# Patient Record
Sex: MALE | Race: WHITE | NOT HISPANIC OR LATINO | Employment: FULL TIME | URBAN - NONMETROPOLITAN AREA
[De-identification: names, ages, dates, MRNs, and addresses within clinical notes are randomized per-mention and may not be internally consistent; named-entity substitution may affect disease eponyms.]

---

## 2023-07-17 ENCOUNTER — OFFICE VISIT (OUTPATIENT)
Dept: PRIMARY CARE | Facility: CLINIC | Age: 38
End: 2023-07-17
Payer: COMMERCIAL

## 2023-07-17 VITALS
WEIGHT: 275.1 LBS | TEMPERATURE: 97 F | SYSTOLIC BLOOD PRESSURE: 130 MMHG | DIASTOLIC BLOOD PRESSURE: 100 MMHG | BODY MASS INDEX: 39.47 KG/M2 | HEART RATE: 94 BPM | OXYGEN SATURATION: 98 %

## 2023-07-17 DIAGNOSIS — G93.31 POST VIRAL SYNDROME: Primary | ICD-10-CM

## 2023-07-17 PROBLEM — D49.2 ABNORMAL SKIN GROWTH: Status: ACTIVE | Noted: 2023-07-17

## 2023-07-17 PROBLEM — E55.9 VITAMIN D DEFICIENCY: Status: ACTIVE | Noted: 2023-07-17

## 2023-07-17 PROBLEM — L91.8 ACQUIRED SKIN TAG: Status: ACTIVE | Noted: 2023-07-17

## 2023-07-17 PROBLEM — F40.243 ANXIETY WITH FLYING: Status: ACTIVE | Noted: 2023-07-17

## 2023-07-17 PROCEDURE — 99213 OFFICE O/P EST LOW 20 MIN: CPT | Performed by: FAMILY MEDICINE

## 2023-07-17 PROCEDURE — 1036F TOBACCO NON-USER: CPT | Performed by: FAMILY MEDICINE

## 2023-07-17 RX ORDER — LORATADINE 10 MG/1
10 TABLET ORAL DAILY
COMMUNITY

## 2023-07-17 NOTE — PROGRESS NOTES
" Subjective   Patient ID: Felix Alonzo is a 37 y.o. male who presents for Cough (Had a cough that started last Tuesday and had nasal congestion. Cough has subsided and other symptoms. Pt has just returned from [facility]. Would like to be confirmed that everything is okay. ).  HPI      Mid chest tightness ; cough is gone,but tightness there .  No fever,chills ,  not pain to touch  .   Wanted to be checked. Feels well except for tightness, like somethingis not cleared.       Non smoker, hxof recurrent uri \"problems \" andtakes clairitin.   No hxof asthma.    No sob,no cid    Review of Systems    Objective   BP (!) 130/100   Pulse 94   Temp 36.1 °C (97 °F) (Temporal)   Wt 125 kg (275 lb 1.6 oz)   SpO2 98%   BMI 39.47 kg/m²     Physical Exam  Vitals reviewed.   Constitutional:       Appearance: Normal appearance.   HENT:      Head: Normocephalic and atraumatic.      Right Ear: Tympanic membrane normal.      Left Ear: Tympanic membrane normal.      Mouth/Throat:      Pharynx: Oropharynx is clear. No posterior oropharyngeal erythema.   Eyes:      Conjunctiva/sclera: Conjunctivae normal.      Pupils: Pupils are equal, round, and reactive to light.   Cardiovascular:      Rate and Rhythm: Normal rate and regular rhythm.   Pulmonary:      Effort: Pulmonary effort is normal.      Breath sounds: Normal breath sounds.   Musculoskeletal:      Cervical back: Normal range of motion. No rigidity.   Lymphadenopathy:      Cervical: No cervical adenopathy.   Neurological:      Mental Status: He is alert.         Assessment/Plan   Problem List Items Addressed This Visit    None  Visit Diagnoses       Post viral syndrome    -  Primary          Sxic measures advised. Reassurance. Exam totallynormal. Chest wallnon tender.Nasal spray (flonase for 10 days, then saline prnmayhelp whenhe has uris)   If not improving, return     DALE Renee MD  "

## 2023-08-30 ENCOUNTER — LAB (OUTPATIENT)
Dept: LAB | Facility: LAB | Age: 38
End: 2023-08-30
Payer: COMMERCIAL

## 2023-08-30 ENCOUNTER — OFFICE VISIT (OUTPATIENT)
Dept: PRIMARY CARE | Facility: CLINIC | Age: 38
End: 2023-08-30
Payer: COMMERCIAL

## 2023-08-30 VITALS
DIASTOLIC BLOOD PRESSURE: 87 MMHG | WEIGHT: 274.21 LBS | OXYGEN SATURATION: 96 % | BODY MASS INDEX: 39.26 KG/M2 | HEIGHT: 70 IN | RESPIRATION RATE: 14 BRPM | SYSTOLIC BLOOD PRESSURE: 119 MMHG | HEART RATE: 85 BPM | TEMPERATURE: 98.2 F

## 2023-08-30 DIAGNOSIS — Z71.84 TRAVEL ADVICE ENCOUNTER: ICD-10-CM

## 2023-08-30 DIAGNOSIS — E66.9 OBESITY (BMI 35.0-39.9 WITHOUT COMORBIDITY): ICD-10-CM

## 2023-08-30 DIAGNOSIS — E55.9 VITAMIN D DEFICIENCY: ICD-10-CM

## 2023-08-30 DIAGNOSIS — L91.8 ACQUIRED SKIN TAG: ICD-10-CM

## 2023-08-30 DIAGNOSIS — Z00.00 WELLNESS EXAMINATION: Primary | ICD-10-CM

## 2023-08-30 DIAGNOSIS — Z00.00 WELLNESS EXAMINATION: ICD-10-CM

## 2023-08-30 PROBLEM — D49.2 ABNORMAL SKIN GROWTH: Status: RESOLVED | Noted: 2023-07-17 | Resolved: 2023-08-30

## 2023-08-30 PROCEDURE — 99396 PREV VISIT EST AGE 40-64: CPT | Performed by: FAMILY MEDICINE

## 2023-08-30 PROCEDURE — 82306 VITAMIN D 25 HYDROXY: CPT

## 2023-08-30 PROCEDURE — 36415 COLL VENOUS BLD VENIPUNCTURE: CPT

## 2023-08-30 PROCEDURE — 1036F TOBACCO NON-USER: CPT | Performed by: FAMILY MEDICINE

## 2023-08-30 PROCEDURE — 85027 COMPLETE CBC AUTOMATED: CPT

## 2023-08-30 PROCEDURE — 80061 LIPID PANEL: CPT

## 2023-08-30 PROCEDURE — 80053 COMPREHEN METABOLIC PANEL: CPT

## 2023-08-30 RX ORDER — MEFLOQUINE HYDROCHLORIDE 250 MG/1
250 TABLET ORAL
Qty: 8 TABLET | Refills: 0 | Status: SHIPPED | OUTPATIENT
Start: 2023-08-30 | End: 2023-09-15

## 2023-08-30 RX ORDER — TIRZEPATIDE 2.5 MG/.5ML
2.5 INJECTION, SOLUTION SUBCUTANEOUS
Qty: 2 ML | Refills: 0 | Status: SHIPPED | OUTPATIENT
Start: 2023-08-30 | End: 2023-09-15 | Stop reason: DRUGHIGH

## 2023-08-30 RX ORDER — AZITHROMYCIN 250 MG/1
TABLET, FILM COATED ORAL
Qty: 6 TABLET | Refills: 0 | Status: SHIPPED | OUTPATIENT
Start: 2023-08-30 | End: 2023-09-04

## 2023-08-30 NOTE — PROGRESS NOTES
"Subjective   Patient ID: Felix Alonzo is a 38 y.o. male who presents for annual physical/wellness visit.    He signed document informing that if problem issues also address at today's wellness visit that insurance may be appropriately billed so co-pay and deductible out of pocket expenses may occur.    Has skin tag that he want removed again that was removed last July. Patient stated that it keeps coming back      Tdap: 01/30/2016   HIV screen:  Hepatitis C screen:  Hepatitis B vaccine:    HPI     Review of Systems    Objective   /87 (BP Location: Left arm, Patient Position: Sitting, BP Cuff Size: Large adult)   Pulse 85   Temp 36.8 °C (98.2 °F) (Temporal)   Resp 14   Ht 1.778 m (5' 10\")   Wt 124 kg (274 lb 3.4 oz)   SpO2 96%   BMI 39.35 kg/m²     Physical Exam  Constitutional:       Appearance: Normal appearance. He is normal weight.   HENT:      Head: Normocephalic.      Right Ear: Tympanic membrane, ear canal and external ear normal.      Left Ear: Tympanic membrane, ear canal and external ear normal.      Nose: Nose normal.      Mouth/Throat:      Mouth: Mucous membranes are moist.   Eyes:      Conjunctiva/sclera: Conjunctivae normal.      Pupils: Pupils are equal, round, and reactive to light.   Cardiovascular:      Rate and Rhythm: Normal rate and regular rhythm.   Pulmonary:      Effort: Pulmonary effort is normal.      Breath sounds: Normal breath sounds.   Abdominal:      General: Abdomen is flat. Bowel sounds are normal.      Palpations: Abdomen is soft.   Musculoskeletal:         General: Normal range of motion.      Cervical back: Neck supple.   Skin:     General: Skin is warm.      Comments: Skin tag removed forehead  Removed with surgical scissors  No complications   Neurological:      General: No focal deficit present.      Mental Status: He is alert and oriented to person, place, and time.   Psychiatric:         Mood and Affect: Mood normal.         Behavior: Behavior normal.    " "     Thought Content: Thought content normal.         Judgment: Judgment normal.         Assessment/Plan   Problem List Items Addressed This Visit       Acquired skin tag    Vitamin D deficiency    Relevant Orders    Vitamin D 25-Hydroxy,Total (for eval of Vitamin D levels) (Completed)    Wellness examination - Primary    Relevant Orders    CBC (Completed)    Lipid Panel (Completed)    Comprehensive Metabolic Panel (Completed)    Travel advice encounter    Relevant Medications    mefloquine (Lariam) 250 mg tablet     Other Visit Diagnoses       Obesity (BMI 35.0-39.9 without comorbidity)        Relevant Medications    tirzepatide (Mounjaro) 2.5 mg/0.5 mL pen injector          Tips for your general wellness...;  Remember importance of daily aerobic exercise for 30minutes to help with both your physical and mental/emotional health.  ;  Take time twice a day to relax with focused breathing and relaxation.  A good source to learn \"mindfulness\" relaxation is a book \"Mindfulness for Beginners\" by Lorenzo Harrington.  ;    Strive for healthy eating with plenty of water, fruits, vegetables, and choosing as much plant based diet as able  If do consume non plant protein, choose fish high in omega (like salmon or cod), skinless poultry, and rarely anything from a cow  Avoid processed foods.  ;  Shop the perimeter of the grocery store  - not the inner aisles where all the boxed, bagged, and canned process non natural foods are   Avoid sugar - including fruit juices with added sugar and avoid artificial sweeteners (sucralose, aspartame).; if want to use sweetener, use stevia (natural plant based non caloric sweetener)  Recommended guided nutrition plans include Mediterranean Diet - online resources available     Get plenty of sleep nightly - 7 hours minimum;    Exercise 150min per week;    Do not use tobacco    Abstain or limit alcohol to 1-2 drinks per 24 hours    See your dentist at least yearly    Have an eye check at least " every 5 years    Follow up 1 year for annual wellness checkup;

## 2023-08-31 LAB
ALANINE AMINOTRANSFERASE (SGPT) (U/L) IN SER/PLAS: 23 U/L (ref 10–52)
ALBUMIN (G/DL) IN SER/PLAS: 4.5 G/DL (ref 3.4–5)
ALKALINE PHOSPHATASE (U/L) IN SER/PLAS: 69 U/L (ref 33–120)
ANION GAP IN SER/PLAS: 14 MMOL/L (ref 10–20)
ASPARTATE AMINOTRANSFERASE (SGOT) (U/L) IN SER/PLAS: 20 U/L (ref 9–39)
BILIRUBIN TOTAL (MG/DL) IN SER/PLAS: 0.8 MG/DL (ref 0–1.2)
CALCIDIOL (25 OH VITAMIN D3) (NG/ML) IN SER/PLAS: 42 NG/ML
CALCIUM (MG/DL) IN SER/PLAS: 10.2 MG/DL (ref 8.6–10.6)
CARBON DIOXIDE, TOTAL (MMOL/L) IN SER/PLAS: 28 MMOL/L (ref 21–32)
CHLORIDE (MMOL/L) IN SER/PLAS: 101 MMOL/L (ref 98–107)
CHOLESTEROL (MG/DL) IN SER/PLAS: 218 MG/DL (ref 0–199)
CHOLESTEROL IN HDL (MG/DL) IN SER/PLAS: 47.2 MG/DL
CHOLESTEROL/HDL RATIO: 4.6
CREATININE (MG/DL) IN SER/PLAS: 0.95 MG/DL (ref 0.5–1.3)
ERYTHROCYTE DISTRIBUTION WIDTH (RATIO) BY AUTOMATED COUNT: 13.2 % (ref 11.5–14.5)
ERYTHROCYTE MEAN CORPUSCULAR HEMOGLOBIN CONCENTRATION (G/DL) BY AUTOMATED: 34.8 G/DL (ref 32–36)
ERYTHROCYTE MEAN CORPUSCULAR VOLUME (FL) BY AUTOMATED COUNT: 89 FL (ref 80–100)
ERYTHROCYTES (10*6/UL) IN BLOOD BY AUTOMATED COUNT: 5.25 X10E12/L (ref 4.5–5.9)
GFR MALE: >90 ML/MIN/1.73M2
GLUCOSE (MG/DL) IN SER/PLAS: 80 MG/DL (ref 74–99)
HEMATOCRIT (%) IN BLOOD BY AUTOMATED COUNT: 46.6 % (ref 41–52)
HEMOGLOBIN (G/DL) IN BLOOD: 16.2 G/DL (ref 13.5–17.5)
LDL: 137 MG/DL (ref 0–99)
LEUKOCYTES (10*3/UL) IN BLOOD BY AUTOMATED COUNT: 6.2 X10E9/L (ref 4.4–11.3)
NRBC (PER 100 WBCS) BY AUTOMATED COUNT: 0 /100 WBC (ref 0–0)
PLATELETS (10*3/UL) IN BLOOD AUTOMATED COUNT: 181 X10E9/L (ref 150–450)
POTASSIUM (MMOL/L) IN SER/PLAS: 4.2 MMOL/L (ref 3.5–5.3)
PROTEIN TOTAL: 7.7 G/DL (ref 6.4–8.2)
SODIUM (MMOL/L) IN SER/PLAS: 139 MMOL/L (ref 136–145)
TRIGLYCERIDE (MG/DL) IN SER/PLAS: 167 MG/DL (ref 0–149)
UREA NITROGEN (MG/DL) IN SER/PLAS: 15 MG/DL (ref 6–23)
VLDL: 33 MG/DL (ref 0–40)

## 2023-09-15 ENCOUNTER — TELEPHONE (OUTPATIENT)
Dept: PRIMARY CARE | Facility: CLINIC | Age: 38
End: 2023-09-15
Payer: COMMERCIAL

## 2023-09-15 DIAGNOSIS — Z71.84 TRAVEL ADVICE ENCOUNTER: Primary | ICD-10-CM

## 2023-09-15 DIAGNOSIS — E66.9 OBESITY (BMI 35.0-39.9 WITHOUT COMORBIDITY): ICD-10-CM

## 2023-09-15 RX ORDER — TIRZEPATIDE 5 MG/.5ML
5 INJECTION, SOLUTION SUBCUTANEOUS
Qty: 2 ML | Refills: 2 | Status: SHIPPED | OUTPATIENT
Start: 2023-09-15 | End: 2023-10-15 | Stop reason: SDUPTHER

## 2023-09-15 RX ORDER — ATOVAQUONE AND PROGUANIL HYDROCHLORIDE 250; 100 MG/1; MG/1
1 TABLET, FILM COATED ORAL DAILY
Qty: 30 TABLET | Refills: 0 | Status: SHIPPED | OUTPATIENT
Start: 2023-09-15 | End: 2024-05-21 | Stop reason: ALTCHOICE

## 2023-09-15 NOTE — TELEPHONE ENCOUNTER
Patient called with a few updates    Miranda check in: has taken 2 doses, third dose tomorrow.  He has no side effects.  He has lost 2 pounds in the last two weeks.  He would like an increase    Mefloquine was prescribed for travel.  He researched the medication and the mental side effects he is not willing to tolerate.  He would like malarone prescribed instead if possible    People in his travel group were also prescribed steroids just in case as well, is this something you recommend/would prescribe for him?    Sending to covering doctor for travel medications, please forward to Dr. Connors after

## 2023-09-15 NOTE — TELEPHONE ENCOUNTER
Mounjaro dose increase sent    Malarone sent for malaria prevention for travel    No indication to prescribe a steroid - not sure why others obtained this

## 2023-10-13 ENCOUNTER — TELEPHONE (OUTPATIENT)
Dept: PRIMARY CARE | Facility: CLINIC | Age: 38
End: 2023-10-13

## 2023-10-13 NOTE — TELEPHONE ENCOUNTER
Pt was told to call and report how he is doing on the mounjaro and he is doing well no side effects and losing about 1/2 to 2 pounds per week. Pt will need a refill next week when you return.

## 2023-10-15 DIAGNOSIS — E66.9 OBESITY (BMI 35.0-39.9 WITHOUT COMORBIDITY): ICD-10-CM

## 2023-10-15 RX ORDER — TIRZEPATIDE 5 MG/.5ML
5 INJECTION, SOLUTION SUBCUTANEOUS
Qty: 2 ML | Refills: 2 | Status: SHIPPED | OUTPATIENT
Start: 2023-10-15 | End: 2023-11-13 | Stop reason: DRUGHIGH

## 2023-11-10 ENCOUNTER — TELEPHONE (OUTPATIENT)
Dept: PRIMARY CARE | Facility: CLINIC | Age: 38
End: 2023-11-10
Payer: COMMERCIAL

## 2023-11-10 NOTE — TELEPHONE ENCOUNTER
Pt called wanting to know when his Mounjaro can be increased. He noticed the weight loss has started to decrease. He has not picked up his Mounjaro for this month just incase you are going to increase the strength. Pt is aware that [employer] is not in the office till Tuesday.

## 2023-11-11 DIAGNOSIS — E66.9 OBESITY (BMI 35.0-39.9 WITHOUT COMORBIDITY): ICD-10-CM

## 2023-11-13 RX ORDER — TIRZEPATIDE 7.5 MG/.5ML
7.5 INJECTION, SOLUTION SUBCUTANEOUS
Qty: 0.5 ML | Refills: 2 | Status: SHIPPED | OUTPATIENT
Start: 2023-11-13 | End: 2024-02-21 | Stop reason: SDUPTHER

## 2023-11-13 NOTE — TELEPHONE ENCOUNTER
06/24/19 1526   Post-Acute Status   Post-Acute Authorization Placement   SW left a Physical Rehab list in pt's room. SW called Areli(sister) and notified her. She is not ready to pick now but will look at list and get back to SW 6/25.    Reyna Bell, RODNEY  [id number]   Increase to 7.5mg weekly  I sent new dose

## 2024-02-21 ENCOUNTER — TELEPHONE (OUTPATIENT)
Dept: PRIMARY CARE | Facility: CLINIC | Age: 39
End: 2024-02-21
Payer: COMMERCIAL

## 2024-02-21 DIAGNOSIS — E66.9 OBESITY (BMI 35.0-39.9 WITHOUT COMORBIDITY): ICD-10-CM

## 2024-02-21 RX ORDER — TIRZEPATIDE 7.5 MG/.5ML
7.5 INJECTION, SOLUTION SUBCUTANEOUS
Qty: 0.5 ML | Refills: 2 | Status: SHIPPED | OUTPATIENT
Start: 2024-02-21 | End: 2024-05-21 | Stop reason: ALTCHOICE

## 2024-03-13 ENCOUNTER — TELEPHONE (OUTPATIENT)
Dept: PRIMARY CARE | Facility: CLINIC | Age: 39
End: 2024-03-13
Payer: COMMERCIAL

## 2024-03-13 DIAGNOSIS — E66.9 OBESITY (BMI 35.0-39.9 WITHOUT COMORBIDITY): Primary | ICD-10-CM

## 2024-03-13 RX ORDER — TIRZEPATIDE 5 MG/.5ML
5 INJECTION, SOLUTION SUBCUTANEOUS
Qty: 6 ML | Refills: 1 | Status: SHIPPED | OUTPATIENT
Start: 2024-03-13 | End: 2024-05-21 | Stop reason: ALTCHOICE

## 2024-03-13 RX ORDER — TIRZEPATIDE 2.5 MG/.5ML
2.5 INJECTION, SOLUTION SUBCUTANEOUS
Qty: 6 ML | Refills: 1 | Status: SHIPPED | OUTPATIENT
Start: 2024-03-13 | End: 2024-05-21 | Stop reason: ALTCHOICE

## 2024-03-13 NOTE — TELEPHONE ENCOUNTER
Patient called back and said that the pharmacy notified him that you should send in a 3 month supply so that he has it.

## 2024-03-13 NOTE — TELEPHONE ENCOUNTER
Pt called because his pharmacy does not have his Mounjaro 7.5mg in stock. They do have Mounjaro 5mg and Mounjaro 2.5mg in stock. Pt could give him self 2 shots and would have to pay 2 co pays. Pt is fine with all this if you are. Pt uses [facility].

## 2024-03-27 PROBLEM — R93.1 ELEVATED CORONARY ARTERY CALCIUM SCORE: Status: ACTIVE | Noted: 2024-03-27

## 2024-05-21 ENCOUNTER — OFFICE VISIT (OUTPATIENT)
Dept: PRIMARY CARE | Facility: CLINIC | Age: 39
End: 2024-05-21
Payer: COMMERCIAL

## 2024-05-21 VITALS
BODY MASS INDEX: 27.31 KG/M2 | OXYGEN SATURATION: 98 % | DIASTOLIC BLOOD PRESSURE: 92 MMHG | TEMPERATURE: 97.6 F | SYSTOLIC BLOOD PRESSURE: 148 MMHG | WEIGHT: 190.3 LBS | HEART RATE: 85 BPM

## 2024-05-21 DIAGNOSIS — R93.1 ELEVATED CORONARY ARTERY CALCIUM SCORE: ICD-10-CM

## 2024-05-21 DIAGNOSIS — E55.9 VITAMIN D DEFICIENCY: ICD-10-CM

## 2024-05-21 DIAGNOSIS — E78.2 MIXED HYPERLIPIDEMIA: ICD-10-CM

## 2024-05-21 DIAGNOSIS — E66.3 OVERWEIGHT (BMI 25.0-29.9): ICD-10-CM

## 2024-05-21 DIAGNOSIS — F40.243 ANXIETY WITH FLYING: Primary | ICD-10-CM

## 2024-05-21 PROBLEM — R20.2 FACIAL PARESTHESIA: Status: ACTIVE | Noted: 2024-05-21

## 2024-05-21 PROBLEM — Z71.84 TRAVEL ADVICE ENCOUNTER: Status: RESOLVED | Noted: 2023-08-30 | Resolved: 2024-05-21

## 2024-05-21 PROCEDURE — 99214 OFFICE O/P EST MOD 30 MIN: CPT | Performed by: FAMILY MEDICINE

## 2024-05-21 RX ORDER — BUSPIRONE HYDROCHLORIDE 5 MG/1
5 TABLET ORAL 3 TIMES DAILY PRN
Qty: 30 TABLET | Refills: 2 | Status: SHIPPED | OUTPATIENT
Start: 2024-05-21 | End: 2025-05-21

## 2024-05-21 RX ORDER — ROSUVASTATIN CALCIUM 5 MG/1
5 TABLET, COATED ORAL DAILY
COMMUNITY

## 2024-05-21 RX ORDER — TIRZEPATIDE 10 MG/.5ML
10 INJECTION, SOLUTION SUBCUTANEOUS
Qty: 2 ML | Refills: 2 | Status: SHIPPED | OUTPATIENT
Start: 2024-05-26

## 2024-05-21 RX ORDER — LORAZEPAM 1 MG/1
1-2 TABLET ORAL EVERY 6 HOURS PRN
Qty: 10 TABLET | Refills: 0 | Status: SHIPPED | OUTPATIENT
Start: 2024-05-21 | End: 2024-06-20

## 2024-05-21 ASSESSMENT — ENCOUNTER SYMPTOMS
EYE PAIN: 1
EYE REDNESS: 1
NUMBNESS: 0
NERVOUS/ANXIOUS: 1

## 2024-05-21 NOTE — ASSESSMENT & PLAN NOTE
Worsening, flies frequently during summer.  Not anxious during actual flight, anxiety peaks days leading up to departure.   Begin Buspar 5 mg TID PRN and Ativan 1-2 mg PRN as prescribed .

## 2024-05-21 NOTE — ASSESSMENT & PLAN NOTE
Lab Results   Component Value Date    CHOL 218 (H) 08/30/2023    CHOL 169 11/05/2021    CHOL 165 10/16/2020     Lab Results   Component Value Date    HDL 47.2 08/30/2023    HDL 35.5 (A) 11/05/2021    HDL 46.0 10/16/2020     Lab Results   Component Value Date    LDLF 137 (H) 08/30/2023     Lab Results   Component Value Date    TRIG 167 (H) 08/30/2023    TRIG 105 11/05/2021    TRIG 79 10/16/2020   Continue rosuvastatin 5 mg daily.  Order placed for repeat lipid panel - due in July.

## 2024-05-21 NOTE — ASSESSMENT & PLAN NOTE
Weight 7/2023 275  With aid of GLP-1 Mounjaro has significantly reduced weight  Continue current 10mg weekly dose

## 2024-05-21 NOTE — ASSESSMENT & PLAN NOTE
Screening CT for executive health  Calcium Score (Agatston Units):  LM: 0 AU  LAD: 97.51 AU  LCx: 0 AU  RCA: 0 AU  Other: 0 AU   Statin has been started  Repeat fasting lipid profile and CV risk labs in 2 months

## 2024-05-21 NOTE — PROGRESS NOTES
Subjective   Patient ID: Felix Alonzo is a 38 y.o. male who presents for weight loss.    Would like to discuss a tightness/discomfort that is happening on the right side of his face.     He is happy with the weight loss that he has had with Mounjaro. Eventually the plan is to drop back down to 7.5 mg, from 10 mg. He reports one week per month where the injection will make him nauseous but he is fine the other 3 weeks. He has been on the increased dose since March of this year.  About 3 weeks ago the right side of his forehead started having a burning sensation that radiated down to his jaw. It is very sensitive and even blinking his eye can bother it. These symptoms have become gradually better since onset.  His flying anxiety is worsening. He finds himself checking the weather that he will go through and he keeps seeing stories that worsen the problem. He states he has 10 international flights in the next 90 days.   His father was diagnosed with stage 2 lung cancer early in January this year, he was a very heavy cigarette smoker most of his life. He just started his 4th round of chemo today and is eligible for surgical intervention.          Review of Systems   Eyes:  Positive for pain (right, mild) and redness (right).   Skin:  Negative for rash.   Neurological:  Negative for numbness.        Facial tightness/burning - right side   Psychiatric/Behavioral:  The patient is nervous/anxious (with flying).        Objective   BP (!) 148/92   Pulse 85   Temp 36.4 °C (97.6 °F)   Wt 86.3 kg (190 lb 4.8 oz)   SpO2 98%   BMI 27.31 kg/m²     Physical Exam  Constitutional:       Appearance: Normal appearance.   HENT:      Head:      Comments: Tenderness from forehead to jaw line, right side - not behind ear  Eyes:      General:         Right eye: No foreign body or discharge.      Pupils: Pupils are equal, round, and reactive to light.   Neurological:      General: No focal deficit present.      Mental Status: He  is alert.   Psychiatric:         Mood and Affect: Mood normal.         Behavior: Behavior normal.         Thought Content: Thought content normal.         Judgment: Judgment normal.         Assessment/Plan   Problem List Items Addressed This Visit       Anxiety with flying - Primary     Worsening, flies frequently during summer.  Not anxious during actual flight, anxiety peaks days leading up to departure.   Begin Buspar 5 mg TID PRN and Ativan 1-2 mg PRN as prescribed .         Relevant Medications    LORazepam (Ativan) 1 mg tablet    Vitamin D deficiency     8/30/2023 - 42  Continue vitamin D supplement daily.         Elevated coronary artery calcium score     Screening CT for executive health  Calcium Score (Agatston Units):  LM: 0 AU  LAD: 97.51 AU  LCx: 0 AU  RCA: 0 AU  Other: 0 AU   Statin has been started  Repeat fasting lipid profile and CV risk labs in 2 months         Relevant Medications    busPIRone (Buspar) 5 mg tablet    tirzepatide (Mounjaro) 10 mg/0.5 mL pen injector    Other Relevant Orders    Lipoprotein A (LPA)    Homocysteine, serum    Mixed hyperlipidemia     Lab Results   Component Value Date    CHOL 218 (H) 08/30/2023    CHOL 169 11/05/2021    CHOL 165 10/16/2020     Lab Results   Component Value Date    HDL 47.2 08/30/2023    HDL 35.5 (A) 11/05/2021    HDL 46.0 10/16/2020     Lab Results   Component Value Date    LDLF 137 (H) 08/30/2023     Lab Results   Component Value Date    TRIG 167 (H) 08/30/2023    TRIG 105 11/05/2021    TRIG 79 10/16/2020   Continue rosuvastatin 5 mg daily.  Order placed for repeat lipid panel - due in July.         Relevant Medications    busPIRone (Buspar) 5 mg tablet    tirzepatide (Mounjaro) 10 mg/0.5 mL pen injector    Other Relevant Orders    Lipid Panel    Lipoprotein A (LPA)    Homocysteine, serum    Overweight (BMI 25.0-29.9)     Weight 7/2023 275  With aid of GLP-1 Mounjaro has significantly reduced weight  Continue current 10mg weekly dose          Relevant  Medications    tirzepatide (Mounjaro) 10 mg/0.5 mL pen injector       Scribe Attestation  By signing my name below, I, Mahendra Garcia   attest that this documentation has been prepared under the direction and in the presence of Heriberto Connors MD.

## 2024-05-23 ENCOUNTER — APPOINTMENT (OUTPATIENT)
Dept: PRIMARY CARE | Facility: CLINIC | Age: 39
End: 2024-05-23
Payer: COMMERCIAL

## 2024-05-28 NOTE — ASSESSMENT & PLAN NOTE
I believe he had shingles without rash   Symptoms resolving  Call if not continue to resolve completely over next month

## 2024-06-24 DIAGNOSIS — E78.2 MIXED HYPERLIPIDEMIA: Primary | ICD-10-CM

## 2024-06-24 RX ORDER — ROSUVASTATIN CALCIUM 5 MG/1
5 TABLET, COATED ORAL DAILY
Qty: 90 TABLET | Refills: 1 | Status: SHIPPED | OUTPATIENT
Start: 2024-06-24

## 2024-07-12 ENCOUNTER — LAB (OUTPATIENT)
Dept: LAB | Facility: LAB | Age: 39
End: 2024-07-12
Payer: COMMERCIAL

## 2024-07-12 DIAGNOSIS — E78.2 MIXED HYPERLIPIDEMIA: ICD-10-CM

## 2024-07-12 DIAGNOSIS — R93.1 ELEVATED CORONARY ARTERY CALCIUM SCORE: ICD-10-CM

## 2024-07-12 LAB
CHOLEST SERPL-MCNC: 127 MG/DL (ref 0–199)
CHOLESTEROL/HDL RATIO: 2.4
HDLC SERPL-MCNC: 52.3 MG/DL
LDLC SERPL CALC-MCNC: 61 MG/DL
NON HDL CHOLESTEROL: 75 MG/DL (ref 0–149)
TRIGL SERPL-MCNC: 70 MG/DL (ref 0–149)
VLDL: 14 MG/DL (ref 0–40)

## 2024-07-12 PROCEDURE — 83090 ASSAY OF HOMOCYSTEINE: CPT

## 2024-07-12 PROCEDURE — 36415 COLL VENOUS BLD VENIPUNCTURE: CPT

## 2024-07-12 PROCEDURE — 82172 ASSAY OF APOLIPOPROTEIN: CPT

## 2024-07-12 PROCEDURE — 80061 LIPID PANEL: CPT

## 2024-07-13 LAB
HCYS SERPL-SCNC: 11.92 UMOL/L (ref 5–13.9)
LPA SERPL-MCNC: 136 MG/DL

## 2024-08-02 ENCOUNTER — LAB (OUTPATIENT)
Dept: LAB | Facility: LAB | Age: 39
End: 2024-08-02
Payer: COMMERCIAL

## 2024-08-02 ENCOUNTER — OFFICE VISIT (OUTPATIENT)
Dept: PRIMARY CARE | Facility: CLINIC | Age: 39
End: 2024-08-02
Payer: COMMERCIAL

## 2024-08-02 VITALS
HEART RATE: 92 BPM | SYSTOLIC BLOOD PRESSURE: 116 MMHG | WEIGHT: 180.4 LBS | OXYGEN SATURATION: 97 % | RESPIRATION RATE: 14 BRPM | DIASTOLIC BLOOD PRESSURE: 73 MMHG | BODY MASS INDEX: 25.88 KG/M2 | TEMPERATURE: 98.4 F

## 2024-08-02 DIAGNOSIS — R23.8 SKIN SENSITIVITY: ICD-10-CM

## 2024-08-02 DIAGNOSIS — L29.9 ITCHING: Primary | ICD-10-CM

## 2024-08-02 DIAGNOSIS — L29.9 ITCHING: ICD-10-CM

## 2024-08-02 PROCEDURE — 99214 OFFICE O/P EST MOD 30 MIN: CPT | Performed by: FAMILY MEDICINE

## 2024-08-02 PROCEDURE — 84443 ASSAY THYROID STIM HORMONE: CPT

## 2024-08-02 PROCEDURE — 1036F TOBACCO NON-USER: CPT | Performed by: FAMILY MEDICINE

## 2024-08-02 PROCEDURE — 82248 BILIRUBIN DIRECT: CPT

## 2024-08-02 PROCEDURE — 36415 COLL VENOUS BLD VENIPUNCTURE: CPT

## 2024-08-02 PROCEDURE — 80053 COMPREHEN METABOLIC PANEL: CPT

## 2024-08-02 RX ORDER — METHYLPREDNISOLONE 4 MG/1
TABLET ORAL
Qty: 21 TABLET | Refills: 0 | Status: SHIPPED | OUTPATIENT
Start: 2024-08-02

## 2024-08-02 RX ORDER — NEOMYCIN SULFATE, POLYMYXIN B SULFATE AND DEXAMETHASONE 3.5; 10000; 1 MG/ML; [USP'U]/ML; MG/ML
SUSPENSION/ DROPS OPHTHALMIC
COMMUNITY
Start: 2024-07-08 | End: 2024-08-02 | Stop reason: WASHOUT

## 2024-08-02 RX ORDER — HYDROXYZINE HYDROCHLORIDE 10 MG/1
TABLET, FILM COATED ORAL
Qty: 30 TABLET | Refills: 0 | Status: SHIPPED | OUTPATIENT
Start: 2024-08-02

## 2024-08-02 NOTE — PROGRESS NOTES
Subjective        Felix Alonzo is a 38 y.o. male who presents for      HPI:    Dr Connors pt       Chief Complaint   Patient presents with    Itching     originated at top, right side of head approx 1 month ago after returning from a trip to the [place]. Sxs have since traveled to other areas of body including right side of neck, right axilla and left hip.     Has pt been on any Medication/antibiotic  recently? Augmentin (10 day course-ended 1 week ago, sxs began before start of abx)- for sinus     Any new lotions/creams. Detergents? No    Any new pets? No    Recent travel?  [place], [place], [place] , [place], [place]- vacation and work    No obvious exposures    On Mounjaro for 11 months       Symptoms all in the skin- irritation. Itching    Never have broken out - no blisters       Fever- No  Cough- No  Nausea- No  Vomiting- No  Blood in urine- No  Blood in stool-No  Pain- No  Lip swelling-No  Wheezing- No    how often do symptoms occur? CONSTANT      has pt tried anything for current symptoms, including medications (OTC or prescription)  ?  No      what makes symptoms worse? Nothing       has pt been seen recently for this problem ( within past 2-3 weeks) ?    if yes- where? No                Social History     Tobacco Use   Smoking Status Former    Current packs/day: 0.00    Types: Cigarettes    Quit date:     Years since quittin.5   Smokeless Tobacco Never         Review of Systems:        Objective        Vitals:    24 1412   BP: 116/73   BP Location: Left arm   Patient Position: Sitting   BP Cuff Size: Adult   Pulse: 92   Resp: 14   Temp: 36.9 °C (98.4 °F)   SpO2: 97%   Weight: 81.8 kg (180 lb 6.4 oz)             Patient is alert and oriented x 3 , NAD  HEAD- normocephalic and atraumatic   EYES-conjunctiva-normal, lids - normal  EARS/NOSE- normal external exam   NECK-supple,FROM  CV- RRR without murmur  PULM- CTA bilaterally, normal respiratory effort  RESPIRATORY EFFORT- normal  , no retractions or nasal flaring   ABD- normoactive BS's   EXT- no edema,NT  SKIN- no abnormal skin lesions noted  NEURO- no focal deficits  PSYCH- pleasant, normal judgement and insight            BP Readings from Last 3 Encounters:   08/02/24 116/73   05/21/24 (!) 148/92   08/30/23 119/87           Wt Readings from Last 3 Encounters:   08/02/24 81.8 kg (180 lb 6.4 oz)   05/21/24 86.3 kg (190 lb 4.8 oz)   08/30/23 124 kg (274 lb 3.4 oz)         BMI Readings from Last 3 Encounters:   08/02/24 25.88 kg/m²   05/21/24 27.31 kg/m²   08/30/23 39.35 kg/m²           Assessment & Plan  Itching    Orders:    methylPREDNISolone (Medrol Dospak) 4 mg tablets; Follow schedule on package instructions- Dose Jama    Referral to Allergy; Future    hydrOXYzine HCL (Atarax) 10 mg tablet; Take one tablet three times a day as needed fro itching, do not drive while taking medication    TSH; Future    Basic metabolic panel; Future    Hepatic function panel; Future    Skin sensitivity    Orders:    TSH; Future    Basic metabolic panel; Future    Hepatic function panel; Future         Refer allergist     Start medrol dose pack  Start atarax     On claritin         Ordered labs          Call if no better or if symptoms worsen

## 2024-08-03 LAB
ALBUMIN SERPL BCP-MCNC: 4.6 G/DL (ref 3.4–5)
ALP SERPL-CCNC: 52 U/L (ref 33–120)
ALT SERPL W P-5'-P-CCNC: 18 U/L (ref 10–52)
ANION GAP SERPL CALC-SCNC: 13 MMOL/L (ref 10–20)
AST SERPL W P-5'-P-CCNC: 19 U/L (ref 9–39)
BILIRUB DIRECT SERPL-MCNC: 0.2 MG/DL (ref 0–0.3)
BILIRUB SERPL-MCNC: 0.9 MG/DL (ref 0–1.2)
BUN SERPL-MCNC: 14 MG/DL (ref 6–23)
CALCIUM SERPL-MCNC: 10.3 MG/DL (ref 8.6–10.6)
CHLORIDE SERPL-SCNC: 103 MMOL/L (ref 98–107)
CO2 SERPL-SCNC: 29 MMOL/L (ref 21–32)
CREAT SERPL-MCNC: 1.13 MG/DL (ref 0.5–1.3)
EGFRCR SERPLBLD CKD-EPI 2021: 84 ML/MIN/1.73M*2
GLUCOSE SERPL-MCNC: 89 MG/DL (ref 74–99)
POTASSIUM SERPL-SCNC: 4.5 MMOL/L (ref 3.5–5.3)
PROT SERPL-MCNC: 7.1 G/DL (ref 6.4–8.2)
SODIUM SERPL-SCNC: 140 MMOL/L (ref 136–145)
TSH SERPL-ACNC: 1.25 MIU/L (ref 0.44–3.98)

## 2024-08-06 ENCOUNTER — TELEPHONE (OUTPATIENT)
Dept: PRIMARY CARE | Facility: CLINIC | Age: 39
End: 2024-08-06
Payer: COMMERCIAL

## 2024-08-06 NOTE — TELEPHONE ENCOUNTER
I reviewed chart - it shows he has appointment with [facility] allergist 8/26 - please confirm.  Inquire if having ongoing rash, itching, any changes, any relief with the medrol steroid pack or the atarax Dr You prescribed?  Other option for referral is dermatologist if want to see if can get him seen sooner than 8/26

## 2024-08-06 NOTE — TELEPHONE ENCOUNTER
Patient was seen 8/2 for skin issues with Dr. You    Labs came back normal, was referred to allergist and is not able to be seen until November    He finds this unacceptable and would like other options/PCP opinion     Does not feel like his issue was addressed properly.  Did advise he could call other hospital systems and we can fax referral.  He would like to be seen much sooner than November if you agree this is the correct path

## 2024-08-07 DIAGNOSIS — L29.9 ITCHING: Primary | ICD-10-CM

## 2024-08-07 DIAGNOSIS — L30.9 DERMATITIS: ICD-10-CM

## 2024-08-07 NOTE — TELEPHONE ENCOUNTER
Spoke with pt. Pt did not keep apt with Allergist. Pt still has rash/itching. It has not gotten any worse. Pt would like a referral to a dermatologist.

## 2024-08-20 ENCOUNTER — APPOINTMENT (OUTPATIENT)
Dept: PRIMARY CARE | Facility: CLINIC | Age: 39
End: 2024-08-20
Payer: COMMERCIAL

## 2024-12-02 ENCOUNTER — OFFICE VISIT (OUTPATIENT)
Dept: PRIMARY CARE | Facility: CLINIC | Age: 39
End: 2024-12-02
Payer: COMMERCIAL

## 2024-12-02 VITALS
BODY MASS INDEX: 25.66 KG/M2 | TEMPERATURE: 97.3 F | WEIGHT: 178.8 LBS | HEART RATE: 75 BPM | OXYGEN SATURATION: 98 % | SYSTOLIC BLOOD PRESSURE: 126 MMHG | RESPIRATION RATE: 16 BRPM | DIASTOLIC BLOOD PRESSURE: 78 MMHG

## 2024-12-02 DIAGNOSIS — K64.2 GRADE III HEMORRHOIDS: Primary | ICD-10-CM

## 2024-12-02 PROCEDURE — 99213 OFFICE O/P EST LOW 20 MIN: CPT | Performed by: FAMILY MEDICINE

## 2024-12-02 PROCEDURE — 1036F TOBACCO NON-USER: CPT | Performed by: FAMILY MEDICINE

## 2024-12-02 RX ORDER — PRAMOXINE HYDROCHLORIDE HYDROCORTISONE ACETATE 100; 100 MG/10G; MG/10G
1 AEROSOL, FOAM TOPICAL 2 TIMES DAILY
Qty: 10 G | Refills: 0 | Status: SHIPPED | OUTPATIENT
Start: 2024-12-02

## 2024-12-02 RX ORDER — HYDROCORTISONE 25 MG/G
CREAM TOPICAL 4 TIMES DAILY PRN
Qty: 30 G | Refills: 0 | Status: SHIPPED | OUTPATIENT
Start: 2024-12-02 | End: 2025-12-02

## 2024-12-02 NOTE — PATIENT INSTRUCTIONS
If worsening, more painful, darker purple color, larger, please let us know, we will get you set up with a surgeon urgently.    If not improving, please let us know and we will set up with a surgeon over the next week.    If resolves, can stop treatment after 5-7 days and monitor.

## 2024-12-02 NOTE — PROGRESS NOTES
Subjective   Patient ID: Felix Alonzo is a 39 y.o. male who presents for Rectal Pain (Soreness and discomfort, increasing X approx 4 days/Small sac-like protrusion inside the anus, similar to hector anal hematoma observed by Pt spouse/).  HPI  Patient of Dr. Connors scheduled for anal pain  On Wednesday before Thanksgiving had one day of having more stools than typical, loose and felt some irritation and thought it was just from wiping more. He has noticed some irritation and pain with pressure and pain with having a BM since that time. No bleeding noted. He had his  look and noted a lump in the area of the rectum.       Objective   Visit Vitals  /78 (BP Location: Right arm, Patient Position: Sitting, BP Cuff Size: Adult)   Pulse 75   Temp 36.3 °C (97.3 °F) (Temporal)   Resp 16   Wt 81.1 kg (178 lb 12.8 oz)   SpO2 98%   BMI 25.66 kg/m²   Smoking Status Former   BSA 2 m²      Physical Exam  Patient has a prominent and tender bulge at the 6-7 oclock position of the rectum, size about 4 mm diameter, not thrombosed, reducible, but returns when pressure is released     Assessment/Plan   Problem List Items Addressed This Visit    None  Visit Diagnoses       Grade III hemorrhoids    -  Primary    Relevant Medications    hydrocortisone (Anusol-HC) 2.5 % rectal cream    hydrocortisone-pramoxine (Proctofoam HC) rectal foam

## 2024-12-09 ENCOUNTER — APPOINTMENT (OUTPATIENT)
Dept: PRIMARY CARE | Facility: CLINIC | Age: 39
End: 2024-12-09
Payer: COMMERCIAL

## 2024-12-09 ENCOUNTER — TELEPHONE (OUTPATIENT)
Dept: PRIMARY CARE | Facility: CLINIC | Age: 39
End: 2024-12-09

## 2024-12-09 DIAGNOSIS — K64.2 GRADE III HEMORRHOIDS: Primary | ICD-10-CM

## 2024-12-09 PROBLEM — Z00.00 WELLNESS EXAMINATION: Status: RESOLVED | Noted: 2023-08-30 | Resolved: 2024-12-09

## 2024-12-09 PROBLEM — L91.8 ACQUIRED SKIN TAG: Status: RESOLVED | Noted: 2023-07-17 | Resolved: 2024-12-09

## 2024-12-09 NOTE — TELEPHONE ENCOUNTER
Pt was seen 12/02/2024 for Grade III hemorrhoids and given Rxs for hydrocortisone (Anusol-HC) 2.5 % rectal cream and hydrocortisone-pramoxine (Proctofoam HC) rectal foam.    Pt reports that sxs have not changed and is inquiring when it would be reasonable to expect to see improvement. Please advise of next steps.

## 2024-12-09 NOTE — TELEPHONE ENCOUNTER
Other options outside of  - Alvaro Swanson at [facility] and Dr. Alejandro Sanchez in [place]

## 2024-12-09 NOTE — TELEPHONE ENCOUNTER
If he is still having symptoms, but not worse, it is not urgent, but will likely need to see a hemorrhoid specialist for a more definitive treatment. I will put in a referral today. He can continue with the topical meds, but should have seen some improvement by now. Referral order for hemorrhoid specialist placed.

## 2024-12-13 ENCOUNTER — OFFICE VISIT (OUTPATIENT)
Dept: SURGERY | Facility: CLINIC | Age: 39
End: 2024-12-13
Payer: COMMERCIAL

## 2024-12-13 VITALS — WEIGHT: 178 LBS | HEIGHT: 70 IN | BODY MASS INDEX: 25.48 KG/M2

## 2024-12-13 DIAGNOSIS — K64.2 GRADE III HEMORRHOIDS: ICD-10-CM

## 2024-12-13 DIAGNOSIS — K64.5 THROMBOSED EXTERNAL HEMORRHOID: Primary | ICD-10-CM

## 2024-12-13 PROCEDURE — 99203 OFFICE O/P NEW LOW 30 MIN: CPT | Performed by: NURSE PRACTITIONER

## 2024-12-13 PROCEDURE — 3008F BODY MASS INDEX DOCD: CPT | Performed by: NURSE PRACTITIONER

## 2024-12-13 PROCEDURE — 99213 OFFICE O/P EST LOW 20 MIN: CPT | Performed by: NURSE PRACTITIONER

## 2024-12-13 RX ORDER — HYDROCORTISONE 25 MG/G
OINTMENT TOPICAL 2 TIMES DAILY
Qty: 28 G | Refills: 1 | Status: SHIPPED | OUTPATIENT
Start: 2024-12-13

## 2024-12-13 NOTE — PROGRESS NOTES
History Of Present Illness  Felix Alonzo is a 39 y.o. male presenting with an anal lump.     At Thanksgiving, he felt a discomfort around the anus and a little while later he felt a lump on the anus.  He has been using the Hydrocortisone foam but it is not helping in decreasing the size of the lump.  No c/o any anal pain.      He will have 1-2 soft BM's every day with no straining.  He can sit long on the toilet to have a BM.  No c/o any rectal bleeding.    No colonoscopy.  No hx of any perianal surgeries.      Past Medical History  He has a past medical history of Acute cystitis with hematuria (05/28/2016), Acute gastroenteropathy due to [place] agent (03/21/2018), Acute serous otitis media, right ear (08/25/2018), Acute upper respiratory infection, unspecified (08/29/2019), Adjustment insomnia (03/23/2017), Carbuncle, unspecified (03/23/2017), Contact with and (suspected) exposure to covid-19 (10/16/2020), COVID-19 (06/29/2022), Encounter for immunization safety counseling (08/20/2019), Injury of conjunctiva and corneal abrasion without foreign body, right eye, initial encounter (09/21/2019), Ocular pain, right eye (10/05/2019), Open bite of unspecified hand, initial encounter (07/16/2018), Other chest pain (12/27/2021), Other conditions influencing health status (09/30/2021), Other conditions influencing health status (08/18/2015), Other conditions influencing health status (05/30/2018), Other fecal abnormalities (03/21/2018), Other hypertrophic disorders of the skin (10/30/2020), Other specified anxiety disorders (03/23/2017), Other specified health status, Personal history of diseases of the blood and blood-forming organs and certain disorders involving the immune mechanism (03/20/2018), Personal history of diseases of the skin and subcutaneous tissue (10/30/2020), Personal history of diseases of the skin and subcutaneous tissue (03/21/2016), Personal history of other (healed) physical injury and trauma  (10/05/2019), Personal history of other diseases of the nervous system and sense organs, Personal history of other diseases of the respiratory system (06/01/2018), Personal history of other diseases of the respiratory system (05/30/2018), Personal history of other diseases of the respiratory system (12/31/2017), Personal history of other diseases of urinary system (11/15/2016), Personal history of other specified conditions (06/08/2016), Personal history of other specified conditions (03/20/2018), Personal history of other specified conditions (03/20/2018), Personal history of other specified conditions (12/27/2021), and Personal history of other specified conditions (12/10/2015).    Surgical History  He has a past surgical history that includes Other surgical history (08/21/2017).     Social History  He reports that he quit smoking about 14 years ago. His smoking use included cigarettes. He started smoking about 25 years ago. He has a 11 pack-year smoking history. He has never used smokeless tobacco. He reports current alcohol use. He reports that he does not use drugs.    Family History  Family History   Problem Relation Name Age of Onset    Coronary artery disease Mother          Allergies  Amoxicillin-pot clavulanate and Erythromycin base    Review of Systems   All other systems reviewed and are negative.       Physical Exam  Constitutional:       Appearance: Normal appearance.   HENT:      Head: Normocephalic and atraumatic.   Pulmonary:      Effort: Pulmonary effort is normal.   Genitourinary:     Comments: He has a small thrombosed external hemorrhoid in the right lateral position.  NO active bleeding or pain  Musculoskeletal:         General: Normal range of motion.   Skin:     General: Skin is warm and dry.   Neurological:      General: No focal deficit present.      Mental Status: He is alert and oriented to person, place, and time.   Psychiatric:         Mood and Affect: Mood normal.         Behavior:  Behavior normal.          Last Recorded Vitals  Wt 80.7 kg (178 lb)      Assessment/Plan   Felix has a small thrombosed external hemorrhoid in the right lateral position.  He will start using Hydrocortisone ointment 2-3x/day.  He will do sitz baths throughout the day.  He will continue to keep his stools soft and increase his fiber and water intake.  He will call with any issues and will follow up in 2-3 weeks if not better.       Amelia Palm, APRN-CNP

## 2024-12-16 PROBLEM — K64.5 THROMBOSED EXTERNAL HEMORRHOID: Status: ACTIVE | Noted: 2024-12-16

## 2025-01-10 ENCOUNTER — TELEPHONE (OUTPATIENT)
Dept: PRIMARY CARE | Facility: CLINIC | Age: 40
End: 2025-01-10
Payer: COMMERCIAL

## 2025-01-10 NOTE — TELEPHONE ENCOUNTER
Pt called in with concerns regarding his Mounjaro 10 mg/ 0.5 medication, feels like its no longer having the same effects of results after being on this dosage for a year now. Wants to know if possible are you willing to up dosage to  Mounjaro 12.5 mg/0.5

## 2025-01-21 DIAGNOSIS — E66.3 OVERWEIGHT (BMI 25.0-29.9): ICD-10-CM

## 2025-01-21 DIAGNOSIS — R93.1 ELEVATED CORONARY ARTERY CALCIUM SCORE: ICD-10-CM

## 2025-02-16 ENCOUNTER — OFFICE VISIT (OUTPATIENT)
Dept: URGENT CARE | Age: 40
End: 2025-02-16
Payer: COMMERCIAL

## 2025-02-16 VITALS
SYSTOLIC BLOOD PRESSURE: 131 MMHG | HEART RATE: 116 BPM | TEMPERATURE: 99.6 F | RESPIRATION RATE: 16 BRPM | DIASTOLIC BLOOD PRESSURE: 80 MMHG | OXYGEN SATURATION: 96 %

## 2025-02-16 DIAGNOSIS — J06.9 UPPER RESPIRATORY TRACT INFECTION, UNSPECIFIED TYPE: ICD-10-CM

## 2025-02-16 LAB
POC RAPID INFLUENZA A: NEGATIVE
POC RAPID INFLUENZA B: NEGATIVE

## 2025-02-16 PROCEDURE — 87804 INFLUENZA ASSAY W/OPTIC: CPT | Performed by: STUDENT IN AN ORGANIZED HEALTH CARE EDUCATION/TRAINING PROGRAM

## 2025-02-16 PROCEDURE — 99213 OFFICE O/P EST LOW 20 MIN: CPT | Performed by: STUDENT IN AN ORGANIZED HEALTH CARE EDUCATION/TRAINING PROGRAM

## 2025-02-16 PROCEDURE — 1036F TOBACCO NON-USER: CPT | Performed by: STUDENT IN AN ORGANIZED HEALTH CARE EDUCATION/TRAINING PROGRAM

## 2025-02-16 RX ORDER — BENZONATATE 200 MG/1
200 CAPSULE ORAL EVERY 8 HOURS
Qty: 30 CAPSULE | Refills: 0 | Status: SHIPPED | OUTPATIENT
Start: 2025-02-16 | End: 2025-02-26

## 2025-02-16 RX ORDER — METHYLPREDNISOLONE 4 MG/1
TABLET ORAL
Qty: 21 TABLET | Refills: 0 | Status: SHIPPED | OUTPATIENT
Start: 2025-02-16 | End: 2025-02-22

## 2025-02-16 RX ORDER — DOXYCYCLINE 100 MG/1
100 CAPSULE ORAL 2 TIMES DAILY
Qty: 20 CAPSULE | Refills: 0 | Status: SHIPPED | OUTPATIENT
Start: 2025-02-23 | End: 2025-03-05

## 2025-02-16 ASSESSMENT — ENCOUNTER SYMPTOMS
COUGH: 1
CARDIOVASCULAR NEGATIVE: 1
SINUS PRESSURE: 1
SINUS PAIN: 1
FEVER: 1
GASTROINTESTINAL NEGATIVE: 1
HEADACHES: 1

## 2025-02-16 NOTE — PROGRESS NOTES
Subjective   Patient ID: Felix Alonzo is a 39 y.o. male. They present today with a chief complaint of Sore Throat, Cough, Fever, Headache, Nasal Congestion, and Earache (Sick X 2 days. TD-MA).    History of Present Illness    Sore Throat   Associated symptoms include congestion, coughing, ear pain and headaches.   Cough  Associated symptoms include ear pain, a fever, headaches and postnasal drip.   Fever   Associated symptoms include congestion, coughing, ear pain and headaches.   Headache  Associated symptoms: congestion, cough, drainage, ear pain, fever and sinus pressure    Earache   Associated symptoms include coughing and headaches.     Patient presents to urgent care for a chief complaint of chest congestion chest tenderness, facial congestion headaches body aches and sore throat along with right ear pain over the last 2 days, patient declined COVID test no report of respiratory distress no vomiting or diarrhea no recorded fevers or chills patient has been taking Tylenol Cold and flu denies previous URI  Past Medical History  Allergies as of 02/16/2025 - Reviewed 02/16/2025   Allergen Reaction Noted    Amoxicillin-pot clavulanate Unknown and Diarrhea 07/17/2023    Erythromycin base Unknown and Diarrhea 07/19/2011       (Not in a hospital admission)       Past Medical History:   Diagnosis Date    Acute cystitis with hematuria 05/28/2016    Acute hemorrhagic cystitis    Acute gastroenteropathy due to [place] agent 03/21/2018    Norovirus    Acute serous otitis media, right ear 08/25/2018    Right acute serous otitis media    Acute upper respiratory infection, unspecified 08/29/2019    Viral upper respiratory infection    Adjustment insomnia 03/23/2017    Transient insomnia    Carbuncle, unspecified 03/23/2017    Carbuncle    Contact with and (suspected) exposure to covid-19 10/16/2020    Suspected COVID-19 virus infection    COVID-19 06/29/2022    COVID-19 virus infection    Encounter for immunization  safety counseling 08/20/2019    Immunization counseling    Injury of conjunctiva and corneal abrasion without foreign body, right eye, initial encounter 09/21/2019    Abrasion of right cornea, initial encounter    Ocular pain, right eye 10/05/2019    Pain of right eye    Open bite of unspecified hand, initial encounter 07/16/2018    Human bite of hand    Other chest pain 12/27/2021    Chest discomfort    Other conditions influencing health status 09/30/2021    History of cough    Other conditions influencing health status 08/18/2015    Elevated blood pressure    Other conditions influencing health status 05/30/2018    History of cough    Other fecal abnormalities 03/21/2018    Positive occult stool blood test    Other hypertrophic disorders of the skin 10/30/2020    Inflamed skin tag    Other specified anxiety disorders 03/23/2017    Situational anxiety    Other specified health status     No pertinent past medical history    Personal history of diseases of the blood and blood-forming organs and certain disorders involving the immune mechanism 03/20/2018    History of thrombocytopenia    Personal history of diseases of the skin and subcutaneous tissue 10/30/2020    History of folliculitis    Personal history of diseases of the skin and subcutaneous tissue 03/21/2016    History of eczema    Personal history of other (healed) physical injury and trauma 10/05/2019    History of eye injury    Personal history of other diseases of the nervous system and sense organs     History of eye pain    Personal history of other diseases of the respiratory system 06/01/2018    History of acute pharyngitis    Personal history of other diseases of the respiratory system 05/30/2018    History of acute sinusitis    Personal history of other diseases of the respiratory system 12/31/2017    History of sinusitis    Personal history of other diseases of urinary system 11/15/2016    History of hematuria    Personal history of other  specified conditions 06/08/2016    History of gross hematuria    Personal history of other specified conditions 03/20/2018    History of nausea and vomiting    Personal history of other specified conditions 03/20/2018    History of diarrhea    Personal history of other specified conditions 12/27/2021    History of diarrhea    Personal history of other specified conditions 12/10/2015    History of chest pain       Past Surgical History:   Procedure Laterality Date    OTHER SURGICAL HISTORY  08/21/2017    Oral Surgery Tooth Extraction [facility]        reports that he quit smoking about 15 years ago. His smoking use included cigarettes. He started smoking about 26 years ago. He has a 11 pack-year smoking history. He has never used smokeless tobacco. He reports current alcohol use. He reports that he does not use drugs.    Review of Systems  Review of Systems   Constitutional:  Positive for fever.   HENT:  Positive for congestion, ear pain, postnasal drip, sinus pressure and sinus pain.    Respiratory:  Positive for cough.    Cardiovascular: Negative.    Gastrointestinal: Negative.    Neurological:  Positive for headaches.                                  Objective    Vitals:    02/16/25 1225   BP: 131/80   Pulse: (!) 116   Resp: 16   Temp: 37.6 °C (99.6 °F)   SpO2: 96%     No LMP for male patient.    Physical Exam  Vitals and nursing note reviewed.   Constitutional:       General: He is not in acute distress.     Appearance: Normal appearance. He is not ill-appearing, toxic-appearing or diaphoretic.   HENT:      Head: Normocephalic and atraumatic.      Right Ear: Tympanic membrane normal. There is no impacted cerumen.      Left Ear: Tympanic membrane normal. There is no impacted cerumen.      Nose: Congestion present.      Mouth/Throat:      Mouth: Mucous membranes are moist.      Pharynx: Posterior oropharyngeal erythema present. No oropharyngeal exudate.   Cardiovascular:      Rate and Rhythm: Regular rhythm.  Tachycardia present.      Pulses: Normal pulses.      Heart sounds: Normal heart sounds.   Pulmonary:      Effort: Pulmonary effort is normal. No respiratory distress.      Breath sounds: Normal breath sounds. No stridor. No wheezing, rhonchi or rales.   Lymphadenopathy:      Cervical: No cervical adenopathy.   Skin:     Findings: No rash.   Neurological:      General: No focal deficit present.      Mental Status: He is alert and oriented to person, place, and time.   Psychiatric:         Mood and Affect: Mood normal.         Behavior: Behavior normal.         Procedures    Point of Care Test & Imaging Results from this visit  Results for orders placed or performed in visit on 02/16/25   POCT Influenza A/B manually resulted   Result Value Ref Range    POC Rapid Influenza A Negative Negative    POC Rapid Influenza B Negative Negative      No results found.    Diagnostic study results (if any) were reviewed by Alexis Chou PA-C.    Assessment/Plan   Allergies, medications, history, and pertinent labs/EKGs/Imaging reviewed by Alexsi Chou PA-C.     Medical Decision Making  I did discuss with patient that rapid flu in office was negative, as no previous URI and symptomology I did discuss with patient most likely viral etiology patient replaced on Medrol Dosepak and Tessalon Perles to aid in symptom relief.  I did discuss with patient delayed prescribing of doxycycline which she is to take only if symptoms persist beyond 7 days.  Patient verbalized understanding is agreeable to plan discharge emergent care A+O x 4 stable condition no signs of distress    Orders and Diagnoses  Diagnoses and all orders for this visit:  Upper respiratory tract infection, unspecified type  -     POCT Influenza A/B manually resulted  -     methylPREDNISolone (Medrol Dospak) 4 mg tablets; Take as directed on package.  -     benzonatate (Tessalon) 200 mg capsule; Take 1 capsule (200 mg) by mouth every 8 hours for 10 days. Do not  crush or chew.  -     doxycycline (Vibramycin) 100 mg capsule; Take 1 capsule (100 mg) by mouth 2 times a day for 10 days. Take with at least 8 ounces (large glass) of water, do not lie down for 30 minutes after Do not fill before February 23, 2025.      Medical Admin Record      Patient disposition: Home    Electronically signed by Alexis Chou PA-C  12:48 PM

## 2025-03-13 ENCOUNTER — OFFICE VISIT (OUTPATIENT)
Dept: PRIMARY CARE | Facility: CLINIC | Age: 40
End: 2025-03-13
Payer: COMMERCIAL

## 2025-03-13 VITALS
WEIGHT: 172.2 LBS | RESPIRATION RATE: 14 BRPM | DIASTOLIC BLOOD PRESSURE: 66 MMHG | BODY MASS INDEX: 24.71 KG/M2 | SYSTOLIC BLOOD PRESSURE: 114 MMHG | OXYGEN SATURATION: 97 % | TEMPERATURE: 99.6 F | HEART RATE: 88 BPM

## 2025-03-13 DIAGNOSIS — R93.1 ELEVATED CORONARY ARTERY CALCIUM SCORE: ICD-10-CM

## 2025-03-13 DIAGNOSIS — E55.9 VITAMIN D DEFICIENCY: Primary | ICD-10-CM

## 2025-03-13 DIAGNOSIS — E66.3 OVERWEIGHT (BMI 25.0-29.9): ICD-10-CM

## 2025-03-13 DIAGNOSIS — Z00.00 WELLNESS EXAMINATION: ICD-10-CM

## 2025-03-13 DIAGNOSIS — F40.243 ANXIETY WITH FLYING: ICD-10-CM

## 2025-03-13 DIAGNOSIS — R09.81 SINUS CONGESTION: ICD-10-CM

## 2025-03-13 DIAGNOSIS — E78.2 MIXED HYPERLIPIDEMIA: ICD-10-CM

## 2025-03-13 PROCEDURE — 99214 OFFICE O/P EST MOD 30 MIN: CPT | Performed by: FAMILY MEDICINE

## 2025-03-13 RX ORDER — BUSPIRONE HYDROCHLORIDE 5 MG/1
5 TABLET ORAL 3 TIMES DAILY PRN
Qty: 30 TABLET | Refills: 2 | Status: SHIPPED | OUTPATIENT
Start: 2025-03-13 | End: 2026-03-13

## 2025-03-13 RX ORDER — METHYLPREDNISOLONE 4 MG/1
TABLET ORAL
Qty: 21 TABLET | Refills: 0 | Status: SHIPPED | OUTPATIENT
Start: 2025-03-13 | End: 2025-03-19

## 2025-03-13 RX ORDER — LORAZEPAM 1 MG/1
1-2 TABLET ORAL EVERY 6 HOURS PRN
Qty: 20 TABLET | Refills: 0 | Status: SHIPPED | OUTPATIENT
Start: 2025-03-13 | End: 2025-04-12

## 2025-03-13 RX ORDER — TIRZEPATIDE 12.5 MG/.5ML
INJECTION, SOLUTION SUBCUTANEOUS
COMMUNITY
Start: 2025-03-04

## 2025-03-13 ASSESSMENT — ENCOUNTER SYMPTOMS
NEUROLOGICAL NEGATIVE: 1
GASTROINTESTINAL NEGATIVE: 1
CARDIOVASCULAR NEGATIVE: 1
RESPIRATORY NEGATIVE: 1
MUSCULOSKELETAL NEGATIVE: 1
PSYCHIATRIC NEGATIVE: 1
SORE THROAT: 1
TROUBLE SWALLOWING: 1

## 2025-03-13 NOTE — PROGRESS NOTES
Subjective   Patient ID: Felix Alonzo is a 39 y.o. male who presents for Sinus Problem (X approx 2 days/Teeth pain, earache, sore throat, sensitive/swollen glands) and Med Refill (Ativan and buspirone for flight anxiety/Pt requesting orders for annual labwork).    He does have a trip coming up that will involve flying. With recent events he is already starting to become nervous about the flight. His symptoms are well managed with Ativan and buspirone.  Two nights ago is when he started having sinus problems and a headache. He is also having trouble swallowing because he feels that his glands are swollen and his ears hurt. He does not have a cough. He also does not need to blow his nose. He has not tried anything OTC yet.   He is on Mounjaro 12.5 mg weekly. His weight is remaining stable and in healthy range. He denies any side effects with this medication.          Review of Systems   HENT:  Positive for dental problem, ear pain, sore throat and trouble swallowing.    Respiratory: Negative.     Cardiovascular: Negative.    Gastrointestinal: Negative.    Genitourinary: Negative.    Musculoskeletal: Negative.    Neurological: Negative.    Psychiatric/Behavioral: Negative.         Objective   /66 (BP Location: Right arm, Patient Position: Sitting, BP Cuff Size: Adult)   Pulse 88   Temp 37.6 °C (99.6 °F) (Temporal)   Resp 14   Wt 78.1 kg (172 lb 3.2 oz)   SpO2 97%   BMI 24.71 kg/m²     Physical Exam  Constitutional:       Appearance: Normal appearance. He is normal weight.   HENT:      Head: Normocephalic.      Right Ear: Tympanic membrane normal.      Left Ear: Tympanic membrane normal.      Mouth/Throat:      Pharynx: Posterior oropharyngeal erythema and postnasal drip present.   Eyes:      Conjunctiva/sclera: Conjunctivae normal.   Cardiovascular:      Rate and Rhythm: Normal rate and regular rhythm.      Pulses: Normal pulses.      Heart sounds: Normal heart sounds.   Pulmonary:      Effort:  Pulmonary effort is normal.      Breath sounds: Normal breath sounds.   Musculoskeletal:      Cervical back: Normal range of motion.   Neurological:      General: No focal deficit present.      Mental Status: He is alert.   Psychiatric:         Mood and Affect: Mood normal.         Behavior: Behavior normal.         Thought Content: Thought content normal.         Judgment: Judgment normal.         Assessment/Plan   Problem List Items Addressed This Visit       Anxiety with flying     Worsening, flies frequently during summer.  Not anxious during actual flight, anxiety peaks days leading up to departure.   Continue Buspar 5 mg TID PRN and Ativan 1-2 mg PRN as prescribed .         Relevant Medications    LORazepam (Ativan) 1 mg tablet    Vitamin D deficiency - Primary     Lab Results   Component Value Date    VITD25 42 08/30/2023   Stable.   Continue vitamin D supplement daily.         Elevated coronary artery calcium score     Screening CT for executive health  Calcium Score (Agatston Units):  LM: 0 AU  LAD: 97.51 AU  LCx: 0 AU  RCA: 0 AU  Other: 0 AU   Statin has been started         Relevant Medications    busPIRone (Buspar) 5 mg tablet    Mixed hyperlipidemia     Lab Results   Component Value Date    CHOL 127 07/12/2024    CHOL 218 (H) 08/30/2023    CHOL 169 11/05/2021     Lab Results   Component Value Date    HDL 52.3 07/12/2024    HDL 47.2 08/30/2023    HDL 35.5 (A) 11/05/2021     Lab Results   Component Value Date    LDLCALC 61 07/12/2024     Lab Results   Component Value Date    TRIG 70 07/12/2024    TRIG 167 (H) 08/30/2023    TRIG 105 11/05/2021   Continue rosuvastatin 5 mg daily.         Relevant Medications    busPIRone (Buspar) 5 mg tablet    Overweight (BMI 25.0-29.9)     Last 3 BMI readings:  BMI Readings from Last 3 Encounters:   03/13/25 24.71 kg/m²   12/13/24 25.54 kg/m²   12/02/24 25.66 kg/m²   Weight stable.    Ideal weight is BMI 25 or under.  Think of healthy lifestyle changes rather than a  diet  Weight loss is 75% diet and 25% exercise   Think of daily plate that includes 50% vegetables and do not count peas, corn, white potatoes as a vegetable. 25% complex grains/starch, 25% protein/fat.  Ideal diet is predominately plant based - Vegetables and Fruit. Protein from non meat sources ideal (whole beans, chickpeas). If choosing meat source for protein - first choice is fish in omega-3 such as [place]. Next choice is skinless poultry and last choice and infrequent should be red meat.  Weight loss can be helped through mindful eating. There are apps that can be used to assist with keeping track of your food water and exercise, such as My fitness pal Can see nutritionist if preferred.   Losing 4-6 lbs a month is a sufficient means of gradually losing and maintaining weight loss (good healthy, long term weight loss).           Other Visit Diagnoses       Wellness examination        Relevant Orders    CBC    Lipid Panel    Comprehensive Metabolic Panel    Sinus congestion        Relevant Medications    methylPREDNISolone (Medrol Dospak) 4 mg tablets          Follow up: Scheduled 4/8/2025    Scribe Attestation  By signing my name below, ISheri Scribe   attest that this documentation has been prepared under the direction and in the presence of Heriberto Connors MD.

## 2025-03-13 NOTE — ASSESSMENT & PLAN NOTE
Lab Results   Component Value Date    CHOL 127 07/12/2024    CHOL 218 (H) 08/30/2023    CHOL 169 11/05/2021     Lab Results   Component Value Date    HDL 52.3 07/12/2024    HDL 47.2 08/30/2023    HDL 35.5 (A) 11/05/2021     Lab Results   Component Value Date    LDLCALC 61 07/12/2024     Lab Results   Component Value Date    TRIG 70 07/12/2024    TRIG 167 (H) 08/30/2023    TRIG 105 11/05/2021   Continue rosuvastatin 5 mg daily.

## 2025-03-13 NOTE — ASSESSMENT & PLAN NOTE
Worsening, flies frequently during summer.  Not anxious during actual flight, anxiety peaks days leading up to departure.   Continue Buspar 5 mg TID PRN and Ativan 1-2 mg PRN as prescribed .

## 2025-03-13 NOTE — ASSESSMENT & PLAN NOTE
Last 3 BMI readings:  BMI Readings from Last 3 Encounters:   03/13/25 24.71 kg/m²   12/13/24 25.54 kg/m²   12/02/24 25.66 kg/m²   Weight stable.    Ideal weight is BMI 25 or under.  Think of healthy lifestyle changes rather than a diet  Weight loss is 75% diet and 25% exercise   Think of daily plate that includes 50% vegetables and do not count peas, corn, white potatoes as a vegetable. 25% complex grains/starch, 25% protein/fat.  Ideal diet is predominately plant based - Vegetables and Fruit. Protein from non meat sources ideal (whole beans, chickpeas). If choosing meat source for protein - first choice is fish in omega-3 such as [place]. Next choice is skinless poultry and last choice and infrequent should be red meat.  Weight loss can be helped through mindful eating. There are apps that can be used to assist with keeping track of your food water and exercise, such as My fitness pal Can see nutritionist if preferred.   Losing 4-6 lbs a month is a sufficient means of gradually losing and maintaining weight loss (good healthy, long term weight loss).

## 2025-03-13 NOTE — ASSESSMENT & PLAN NOTE
Screening CT for executive health  Calcium Score (Agatston Units):  LM: 0 AU  LAD: 97.51 AU  LCx: 0 AU  RCA: 0 AU  Other: 0 AU   Statin has been started

## 2025-03-13 NOTE — ASSESSMENT & PLAN NOTE
Lab Results   Component Value Date    VITD25 42 08/30/2023   Stable.   Continue vitamin D supplement daily.

## 2025-03-14 LAB
ALBUMIN SERPL-MCNC: 4.5 G/DL (ref 3.6–5.1)
ALP SERPL-CCNC: 60 U/L (ref 36–130)
ALT SERPL-CCNC: 15 U/L (ref 9–46)
ANION GAP SERPL CALCULATED.4IONS-SCNC: 8 MMOL/L (CALC) (ref 7–17)
AST SERPL-CCNC: 17 U/L (ref 10–40)
BILIRUB SERPL-MCNC: 1.2 MG/DL (ref 0.2–1.2)
BUN SERPL-MCNC: 9 MG/DL (ref 7–25)
CALCIUM SERPL-MCNC: 9.7 MG/DL (ref 8.6–10.3)
CHLORIDE SERPL-SCNC: 103 MMOL/L (ref 98–110)
CHOLEST SERPL-MCNC: 142 MG/DL
CHOLEST/HDLC SERPL: 2 (CALC)
CO2 SERPL-SCNC: 27 MMOL/L (ref 20–32)
CREAT SERPL-MCNC: 0.85 MG/DL (ref 0.6–1.29)
EGFRCR SERPLBLD CKD-EPI 2021: 112 ML/MIN/1.73M2
ERYTHROCYTE [DISTWIDTH] IN BLOOD BY AUTOMATED COUNT: 12.1 % (ref 11–15)
GLUCOSE SERPL-MCNC: 90 MG/DL (ref 65–99)
HCT VFR BLD AUTO: 45.6 % (ref 38.5–50)
HDLC SERPL-MCNC: 71 MG/DL
HGB BLD-MCNC: 15.4 G/DL (ref 13.2–17.1)
LDLC SERPL CALC-MCNC: 58 MG/DL (CALC)
MCH RBC QN AUTO: 32.2 PG (ref 27–33)
MCHC RBC AUTO-ENTMCNC: 33.8 G/DL (ref 32–36)
MCV RBC AUTO: 95.4 FL (ref 80–100)
NONHDLC SERPL-MCNC: 71 MG/DL (CALC)
PLATELET # BLD AUTO: 130 THOUSAND/UL (ref 140–400)
PMV BLD REES-ECKER: 9.7 FL (ref 7.5–12.5)
POTASSIUM SERPL-SCNC: 4.4 MMOL/L (ref 3.5–5.3)
PROT SERPL-MCNC: 7.1 G/DL (ref 6.1–8.1)
RBC # BLD AUTO: 4.78 MILLION/UL (ref 4.2–5.8)
SODIUM SERPL-SCNC: 138 MMOL/L (ref 135–146)
TRIGL SERPL-MCNC: 53 MG/DL
WBC # BLD AUTO: 9.4 THOUSAND/UL (ref 3.8–10.8)

## 2025-03-24 ENCOUNTER — TELEPHONE (OUTPATIENT)
Dept: PRIMARY CARE | Facility: CLINIC | Age: 40
End: 2025-03-24

## 2025-03-24 NOTE — TELEPHONE ENCOUNTER
Called pt to reschedule the 4/8/25 for med f/u, at which time he wanted to know if this appt was necessary and is waiting a response before rescheduling.

## 2025-04-08 ENCOUNTER — APPOINTMENT (OUTPATIENT)
Dept: PRIMARY CARE | Facility: CLINIC | Age: 40
End: 2025-04-08
Payer: COMMERCIAL

## 2025-06-03 ENCOUNTER — APPOINTMENT (OUTPATIENT)
Dept: PRIMARY CARE | Facility: CLINIC | Age: 40
End: 2025-06-03
Payer: COMMERCIAL

## 2025-06-03 VITALS
SYSTOLIC BLOOD PRESSURE: 115 MMHG | HEART RATE: 87 BPM | RESPIRATION RATE: 14 BRPM | HEIGHT: 70 IN | BODY MASS INDEX: 24.6 KG/M2 | OXYGEN SATURATION: 98 % | DIASTOLIC BLOOD PRESSURE: 72 MMHG | WEIGHT: 171.8 LBS | TEMPERATURE: 98.3 F

## 2025-06-03 DIAGNOSIS — E66.3 OVERWEIGHT (BMI 25.0-29.9): ICD-10-CM

## 2025-06-03 DIAGNOSIS — N45.1 EPIDIDYMITIS: Primary | ICD-10-CM

## 2025-06-03 PROCEDURE — 3008F BODY MASS INDEX DOCD: CPT | Performed by: FAMILY MEDICINE

## 2025-06-03 PROCEDURE — 99214 OFFICE O/P EST MOD 30 MIN: CPT | Performed by: FAMILY MEDICINE

## 2025-06-03 RX ORDER — TIRZEPATIDE 12.5 MG/.5ML
12.5 INJECTION, SOLUTION SUBCUTANEOUS WEEKLY
Qty: 2 ML | Refills: 2 | Status: SHIPPED | OUTPATIENT
Start: 2025-06-03

## 2025-06-03 RX ORDER — DOXYCYCLINE 100 MG/1
100 CAPSULE ORAL 2 TIMES DAILY
Qty: 28 CAPSULE | Refills: 0 | Status: SHIPPED | OUTPATIENT
Start: 2025-06-03 | End: 2025-06-17

## 2025-06-03 ASSESSMENT — ENCOUNTER SYMPTOMS
NEUROLOGICAL NEGATIVE: 1
CARDIOVASCULAR NEGATIVE: 1
MUSCULOSKELETAL NEGATIVE: 1
GASTROINTESTINAL NEGATIVE: 1
RESPIRATORY NEGATIVE: 1
PSYCHIATRIC NEGATIVE: 1

## 2025-06-03 NOTE — ASSESSMENT & PLAN NOTE
Last 3 BMI readings:  BMI Readings from Last 3 Encounters:   06/03/25 24.65 kg/m²   03/13/25 24.71 kg/m²   12/13/24 25.54 kg/m²   Weight stable.    Ideal weight is BMI 25 or under.  Think of healthy lifestyle changes rather than a diet  Weight loss is 75% diet and 25% exercise   Think of daily plate that includes 50% vegetables and do not count peas, corn, white potatoes as a vegetable. 25% complex grains/starch, 25% protein/fat.  Ideal diet is predominately plant based - Vegetables and Fruit. Protein from non meat sources ideal (whole beans, chickpeas). If choosing meat source for protein - first choice is fish in omega-3 such as [place]. Next choice is skinless poultry and last choice and infrequent should be red meat.  Weight loss can be helped through mindful eating. There are apps that can be used to assist with keeping track of your food water and exercise, such as My fitness pal Can see nutritionist if preferred.   Losing 4-6 lbs a month is a sufficient means of gradually losing and maintaining weight loss (good healthy, long term weight loss).

## 2025-06-03 NOTE — PROGRESS NOTES
"Subjective   Patient ID: Felix Alonzo is a 39 y.o. male who presents for Testicle Pain (Right-intermittent (approx 4-5 days out of the week) x approx 2+ weeks/No know injury).    There is no injury or trauma per patient. The first time he noticed it was when he was driving home a couple weeks ago. The right testicle is the only one effected, nothing on the left. He had one day when the pain first started that urinating was a bit painful but it has not happened since and there was no discoloration or blood noticed. He does not ride motorcycles or bikes. There is no history of testicular problems at any point in his life.          Review of Systems   HENT: Negative.     Respiratory: Negative.     Cardiovascular: Negative.    Gastrointestinal: Negative.    Genitourinary:  Positive for testicular pain (R side).   Musculoskeletal: Negative.    Neurological: Negative.    Psychiatric/Behavioral: Negative.         Objective   /72 (BP Location: Left arm, Patient Position: Sitting, BP Cuff Size: Adult)   Pulse 87   Temp 36.8 °C (98.3 °F) (Temporal)   Resp 14   Ht 1.778 m (5' 10\")   Wt 77.9 kg (171 lb 12.8 oz)   SpO2 98%   BMI 24.65 kg/m²     Physical Exam  Constitutional:       Appearance: Normal appearance. He is normal weight.   HENT:      Head: Normocephalic.   Eyes:      Conjunctiva/sclera: Conjunctivae normal.   Abdominal:      Hernia: There is no hernia in the right inguinal area.   Genitourinary:     Testes: Normal.         Right: Mass, tenderness or swelling not present.      Epididymis:      Right: Tenderness present.      Left: Normal.   Musculoskeletal:      Cervical back: Normal range of motion.   Neurological:      General: No focal deficit present.      Mental Status: He is alert.   Psychiatric:         Mood and Affect: Mood normal.         Behavior: Behavior normal.         Thought Content: Thought content normal.         Judgment: Judgment normal.         Assessment/Plan   Problem List " Items Addressed This Visit       Overweight (BMI 25.0-29.9)    Last 3 BMI readings:  BMI Readings from Last 3 Encounters:   06/03/25 24.65 kg/m²   03/13/25 24.71 kg/m²   12/13/24 25.54 kg/m²   Weight stable.    Ideal weight is BMI 25 or under.  Think of healthy lifestyle changes rather than a diet  Weight loss is 75% diet and 25% exercise   Think of daily plate that includes 50% vegetables and do not count peas, corn, white potatoes as a vegetable. 25% complex grains/starch, 25% protein/fat.  Ideal diet is predominately plant based - Vegetables and Fruit. Protein from non meat sources ideal (whole beans, chickpeas). If choosing meat source for protein - first choice is fish in omega-3 such as [place]. Next choice is skinless poultry and last choice and infrequent should be red meat.  Weight loss can be helped through mindful eating. There are apps that can be used to assist with keeping track of your food water and exercise, such as My fitness pal Can see nutritionist if preferred.   Losing 4-6 lbs a month is a sufficient means of gradually losing and maintaining weight loss (good healthy, long term weight loss).          Relevant Medications    tirzepatide (Mounjaro) 12.5 mg/0.5 mL pen injector    Epididymitis - Primary    Call if treatment not effective     If recurs in future, try aleve or ibuprofen for 5-7 days and call if this not resolve     Epididymitis -      What is epididymitis?  The epididymis is a long, tightly coiled tube that lies above and behind each testicle. It collects and stores maturing sperm made by the testicles prior to ejaculation.  Infection of the epididymis is called epididymitis      What causes epididymitis?  The causes of epididymitis vary depending on your age and behavior. In children it is most commonly associated with urinary tract infections.   In young, sexually active men, it is often linked to sexually transmitted infection     And in older men it is often caused by enlargement  of the prostate . And an injury to the groin may cause epididymitis.     What are the symptoms?  Pain, tenderness, and swelling in the scrotum (epididymides or testicles) that gradually get worse are the most common symptoms of epididymitis. Other symptoms may include fever and chills frequent or painful urination or a discharge from the penis        How is it treated?  Antibiotics are used to treat epididymitis  and anti-inflammatory medicines (such as ibuprofen)     it can take 2-6 weeks of antibiotic therapy to be effective.     If your symptoms do not improve or worsen with treatment prescribed, please call.          Relevant Medications    doxycycline (Vibramycin) 100 mg capsule    Other Relevant Orders    Urinalysis with Reflex Culture and Microscopic (Completed)     Follow up: Schedule    Scribe Attestation  By signing my name below, ISheri Scribe   attest that this documentation has been prepared under the direction and in the presence of Heriberto Connors MD.

## 2025-06-03 NOTE — ASSESSMENT & PLAN NOTE
Call if treatment not effective     If recurs in future, try aleve or ibuprofen for 5-7 days and call if this not resolve     Epididymitis -      What is epididymitis?  The epididymis is a long, tightly coiled tube that lies above and behind each testicle. It collects and stores maturing sperm made by the testicles prior to ejaculation.  Infection of the epididymis is called epididymitis      What causes epididymitis?  The causes of epididymitis vary depending on your age and behavior. In children it is most commonly associated with urinary tract infections.   In young, sexually active men, it is often linked to sexually transmitted infection     And in older men it is often caused by enlargement of the prostate . And an injury to the groin may cause epididymitis.     What are the symptoms?  Pain, tenderness, and swelling in the scrotum (epididymides or testicles) that gradually get worse are the most common symptoms of epididymitis. Other symptoms may include fever and chills frequent or painful urination or a discharge from the penis        How is it treated?  Antibiotics are used to treat epididymitis  and anti-inflammatory medicines (such as ibuprofen)     it can take 2-6 weeks of antibiotic therapy to be effective.     If your symptoms do not improve or worsen with treatment prescribed, please call.

## 2025-06-04 LAB
APPEARANCE UR: CLEAR
BACTERIA #/AREA URNS HPF: NORMAL /HPF
BACTERIA UR CULT: NORMAL
BILIRUB UR QL STRIP: NEGATIVE
COLOR UR: YELLOW
GLUCOSE UR QL STRIP: NEGATIVE
HGB UR QL STRIP: NEGATIVE
HYALINE CASTS #/AREA URNS LPF: NORMAL /LPF
KETONES UR QL STRIP: NEGATIVE
LEUKOCYTE ESTERASE UR QL STRIP: NEGATIVE
NITRITE UR QL STRIP: NEGATIVE
PH UR STRIP: 7.5 [PH] (ref 5–8)
PROT UR QL STRIP: NEGATIVE
RBC #/AREA URNS HPF: NORMAL /HPF
SERVICE CMNT-IMP: NORMAL
SP GR UR STRIP: 1 (ref 1–1.03)
SQUAMOUS #/AREA URNS HPF: NORMAL /HPF
WBC #/AREA URNS HPF: NORMAL /HPF

## 2025-06-16 ENCOUNTER — TELEPHONE (OUTPATIENT)
Dept: PRIMARY CARE | Facility: CLINIC | Age: 40
End: 2025-06-16
Payer: COMMERCIAL

## 2025-06-16 DIAGNOSIS — N50.819 PAIN IN TESTICLE, UNSPECIFIED LATERALITY: Primary | ICD-10-CM

## 2025-06-16 NOTE — TELEPHONE ENCOUNTER
Pt called in today stating he's still having pain in testicles and wanted to know if possible could you squeeze him in within the next week or so to exam this again ?

## 2025-06-27 ENCOUNTER — OFFICE VISIT (OUTPATIENT)
Dept: PRIMARY CARE | Facility: CLINIC | Age: 40
End: 2025-06-27
Payer: COMMERCIAL

## 2025-06-27 VITALS
SYSTOLIC BLOOD PRESSURE: 114 MMHG | WEIGHT: 166.3 LBS | DIASTOLIC BLOOD PRESSURE: 73 MMHG | HEART RATE: 102 BPM | TEMPERATURE: 97.6 F | BODY MASS INDEX: 23.86 KG/M2 | OXYGEN SATURATION: 98 % | RESPIRATION RATE: 14 BRPM

## 2025-06-27 DIAGNOSIS — J40 BRONCHITIS: Primary | ICD-10-CM

## 2025-06-27 PROCEDURE — 99213 OFFICE O/P EST LOW 20 MIN: CPT | Performed by: FAMILY MEDICINE

## 2025-06-27 PROCEDURE — 1036F TOBACCO NON-USER: CPT | Performed by: FAMILY MEDICINE

## 2025-06-27 RX ORDER — METHYLPREDNISOLONE 4 MG/1
TABLET ORAL
Qty: 21 TABLET | Refills: 0 | Status: SHIPPED | OUTPATIENT
Start: 2025-06-27 | End: 2025-07-03

## 2025-06-27 RX ORDER — AZITHROMYCIN 250 MG/1
TABLET, FILM COATED ORAL
Qty: 6 TABLET | Refills: 0 | Status: SHIPPED | OUTPATIENT
Start: 2025-06-27 | End: 2025-07-02

## 2025-06-27 RX ORDER — BENZONATATE 100 MG/1
100 CAPSULE ORAL 3 TIMES DAILY PRN
Qty: 42 CAPSULE | Refills: 0 | Status: SHIPPED | OUTPATIENT
Start: 2025-06-27 | End: 2025-07-27

## 2025-06-27 ASSESSMENT — ENCOUNTER SYMPTOMS
CHILLS: 0
WHEEZING: 0
CHEST TIGHTNESS: 1
SORE THROAT: 0
COUGH: 1
FEVER: 0

## 2025-06-27 NOTE — PROGRESS NOTES
Subjective   Patient ID: Felix Alonzo is a 39 y.o. male who presents for Cough (Pt presents for cough, chest congestion, ear pressure, tickle in throat, denies fever, chills X4-5 days. Pt not taking any OTC meds in last few days).    HPI     Here today for cough, congestion x 4-5 days   Leaving for [place] tomorrow   No fever, chills   Chest tight  Throat tickle   Ear pressure   No sore throat     Current Medications[1]    Review of Systems   Constitutional:  Negative for chills and fever.   HENT:  Positive for postnasal drip. Negative for congestion, ear pain and sore throat.    Respiratory:  Positive for cough and chest tightness. Negative for wheezing.    Cardiovascular:  Negative for chest pain.       Objective   /73 (BP Location: Left arm, Patient Position: Sitting, BP Cuff Size: Adult)   Pulse 102   Temp 36.4 °C (97.6 °F) (Temporal)   Resp 14   Wt 75.4 kg (166 lb 4.8 oz)   SpO2 98%   BMI 23.86 kg/m²     Physical Exam    Constitutional: Well developed, well nourished, alert and in no acute distress.  Head and Face: NC/AT  Eyes: Normal external exam. Pupils equally round and reactive to light with normal accommodation and extraocular movements intact.  ENT: External inspection of ears normal, tympanic membranes visualized and normal. Nasal mucosa and turbinates swollen and erythematous, no nasal discharge present. Oral mucosa moist, oropharynx clear without tonsillar exudate or erythema. +PND   Neck: Supple. No cervical lymphadenopathy   Cardiovascular: Regular rate and rhythm, normal S1 and S2, no murmurs, gallops, or rubs.   Pulmonary: No respiratory distress, lungs clear to auscultation bilaterally. No wheezes, rhonchi, rales.  Skin: Warm, well perfused, normal skin turgor and color.   Neurologic: Cranial nerves II-XII grossly intact.    Assessment/Plan     Problem List Items Addressed This Visit    None  Visit Diagnoses         Bronchitis    -  Primary    Relevant Medications    benzonatate  (Tessalon) 100 mg capsule    azithromycin (Zithromax) 250 mg tablet    methylPREDNISolone (Medrol Dospak) 4 mg tablets            Tonia Reyes DO  6/27/2025         [1]   Current Outpatient Medications   Medication Sig Dispense Refill    busPIRone (Buspar) 5 mg tablet Take 1 tablet (5 mg) by mouth 3 times a day as needed (anxiety). 30 tablet 2    cholecalciferol, vitamin D3, (VITAMIN D3 ORAL) Take by mouth.      loratadine (Claritin) 10 mg tablet Take 1 tablet (10 mg) by mouth once daily.      LORazepam (Ativan) 1 mg tablet Take 1-2 tablets (1-2 mg) by mouth every 6 hours if needed for anxiety (take 30 min before flight for flying anxiety). Take 30 minutes prior to procedure. 20 tablet 0    multivitamin capsule Take 1 capsule by mouth once daily.      rosuvastatin (Crestor) 5 mg tablet Take 1 tablet (5 mg) by mouth once daily. 90 tablet 1    tirzepatide (Mounjaro) 12.5 mg/0.5 mL pen injector Inject 12.5 mg under the skin 1 (one) time per week. 2 mL 2    azithromycin (Zithromax) 250 mg tablet Take 2 tablets (500 mg) by mouth once daily for 1 day, THEN 1 tablet (250 mg) once daily for 4 days. Take 2 tabs (500 mg) by mouth today, than 1 daily for 4 days. 6 tablet 0    benzonatate (Tessalon) 100 mg capsule Take 1 capsule (100 mg) by mouth 3 times a day as needed for cough. Do not crush or chew. 42 capsule 0    methylPREDNISolone (Medrol Dospak) 4 mg tablets Take as directed on package. 21 tablet 0     No current facility-administered medications for this visit.